# Patient Record
Sex: MALE | Race: WHITE | Employment: UNEMPLOYED | URBAN - NONMETROPOLITAN AREA
[De-identification: names, ages, dates, MRNs, and addresses within clinical notes are randomized per-mention and may not be internally consistent; named-entity substitution may affect disease eponyms.]

---

## 2019-09-14 ENCOUNTER — APPOINTMENT (OUTPATIENT)
Dept: GENERAL RADIOLOGY | Age: 29
End: 2019-09-14
Payer: MEDICAID

## 2019-09-14 ENCOUNTER — HOSPITAL ENCOUNTER (EMERGENCY)
Age: 29
Discharge: HOME | End: 2019-09-14
Payer: MEDICAID

## 2019-09-14 VITALS
BODY MASS INDEX: 24.44 KG/M2 | HEART RATE: 68 BPM | OXYGEN SATURATION: 98 % | WEIGHT: 165 LBS | SYSTOLIC BLOOD PRESSURE: 137 MMHG | TEMPERATURE: 97.8 F | RESPIRATION RATE: 18 BRPM | DIASTOLIC BLOOD PRESSURE: 80 MMHG | HEIGHT: 69 IN

## 2019-09-14 DIAGNOSIS — M25.561 CHRONIC PAIN OF RIGHT KNEE: Primary | ICD-10-CM

## 2019-09-14 DIAGNOSIS — G89.29 CHRONIC PAIN OF RIGHT KNEE: Primary | ICD-10-CM

## 2019-09-14 PROCEDURE — 99283 EMERGENCY DEPT VISIT LOW MDM: CPT

## 2019-09-14 PROCEDURE — 6360000002 HC RX W HCPCS: Performed by: PHYSICIAN ASSISTANT

## 2019-09-14 PROCEDURE — 73564 X-RAY EXAM KNEE 4 OR MORE: CPT

## 2019-09-14 PROCEDURE — 96372 THER/PROPH/DIAG INJ SC/IM: CPT

## 2019-09-14 RX ORDER — METHYLPREDNISOLONE ACETATE 80 MG/ML
80 INJECTION, SUSPENSION INTRA-ARTICULAR; INTRALESIONAL; INTRAMUSCULAR; SOFT TISSUE ONCE
Status: COMPLETED | OUTPATIENT
Start: 2019-09-14 | End: 2019-09-14

## 2019-09-14 RX ADMIN — METHYLPREDNISOLONE ACETATE 80 MG: 80 INJECTION, SUSPENSION INTRA-ARTICULAR; INTRALESIONAL; INTRAMUSCULAR; SOFT TISSUE at 16:09

## 2019-09-14 ASSESSMENT — PAIN SCALES - GENERAL: PAINLEVEL_OUTOF10: 6

## 2019-09-14 ASSESSMENT — ENCOUNTER SYMPTOMS
VOMITING: 0
EYE DISCHARGE: 0
NAUSEA: 0
EYE PAIN: 0
DIARRHEA: 0
WHEEZING: 0
COUGH: 0
BACK PAIN: 0
RHINORRHEA: 0
CHEST TIGHTNESS: 0

## 2019-09-14 ASSESSMENT — PAIN DESCRIPTION - LOCATION: LOCATION: KNEE

## 2019-09-14 ASSESSMENT — PAIN DESCRIPTION - DESCRIPTORS: DESCRIPTORS: ACHING

## 2019-09-14 ASSESSMENT — PAIN DESCRIPTION - ORIENTATION: ORIENTATION: RIGHT

## 2019-09-14 ASSESSMENT — PAIN DESCRIPTION - FREQUENCY: FREQUENCY: CONTINUOUS

## 2019-09-14 ASSESSMENT — PAIN DESCRIPTION - PAIN TYPE: TYPE: CHRONIC PAIN

## 2019-09-14 NOTE — ED NOTES
Patient presents to ED with complaint of chronic right knee pain. Patient denies recent injury. Patient states he uses ketamine to self medicate but is wanting to stop using, stating last use was yesterday. Patient states he wants to have his knee pain addressed today.      Sima Santiago RN  09/14/19 [id number]

## 2019-09-14 NOTE — ED PROVIDER NOTES
was 4 views read per radiology       XR KNEE RIGHT (MIN 4 VIEWS) (Final result)   Result time 09/14/19 15:38:12   Final result by Juancarlos Adams MD (09/14/19 15:38:12)                Impression:    1. No acute radiographic abnormality. **This report has been created using voice recognition software.  It may contain minor errors which are inherent in voice recognition technology. **    Final report electronically signed by Dr. Josi Alonzo on 9/14/2019 3:38 PM            Narrative:    PROCEDURE: XR KNEE RIGHT (MIN 4 VIEWS)    CLINICAL INFORMATION: pain     COMPARISON: No prior study. TECHNIQUE:  Right knee 4 views      FINDINGS:     4 views of the right knee were obtained. No acute fracture or malalignment is seen. No effusion is demonstrated. No periosteal reaction is seen. No osteolytic or osteoblastic lesions are seen. There is a small fabella noted. LABS:   Labs Reviewed - No data to display    EMERGENCY DEPARTMENT COURSE:   :    Vitals:    09/14/19 1500   BP: 137/80   Pulse: 68   Resp: 18   Temp: 97.8 °F (36.6 °C)   TempSrc: Oral   SpO2: 98%   Weight: 165 lb (74.8 kg)   Height: 5' 9\" (1.753 m)     Patient was seen history physical exam was performed. See disposition below    CRITICAL CARE:  None    CONSULTS:  None    PROCEDURES:  None    FINAL IMPRESSION      1. Chronic pain of right knee          DISPOSITION/PLAN   Discharge    PATIENT REFERRED TO:  [address] [place] [address],[address]  [address] [place] [address]  In 2 days      [facility] [address] [place] [address]  [phone]  In 2 days        DISCHARGE MEDICATIONS:  Discharge Medication List as of 9/14/2019  4:02 PM          (Please note that portions of this note were completed with a voice recognitionprogram.  Efforts were made to edit the dictations but occasionally words are mis-transcribed.)    Kathryn Kehr, PA Kathryn Kehr,

## 2020-04-16 ENCOUNTER — TELEPHONE (OUTPATIENT)
Dept: FAMILY MEDICINE CLINIC | Age: 30
End: 2020-04-16

## 2020-04-17 ENCOUNTER — HOSPITAL ENCOUNTER (OUTPATIENT)
Dept: GENERAL RADIOLOGY | Age: 30
Discharge: HOME | End: 2020-04-17
Payer: MEDICARE

## 2020-04-17 ENCOUNTER — OFFICE VISIT (OUTPATIENT)
Dept: PRIMARY CARE CLINIC | Age: 30
End: 2020-04-17
Payer: MEDICARE

## 2020-04-17 ENCOUNTER — NURSE ONLY (OUTPATIENT)
Dept: LAB | Age: 30
End: 2020-04-17

## 2020-04-17 ENCOUNTER — HOSPITAL ENCOUNTER (OUTPATIENT)
Age: 30
Discharge: HOME | End: 2020-04-17
Payer: MEDICARE

## 2020-04-17 VITALS
DIASTOLIC BLOOD PRESSURE: 70 MMHG | RESPIRATION RATE: 18 BRPM | HEIGHT: 69 IN | TEMPERATURE: 98.1 F | WEIGHT: 164 LBS | OXYGEN SATURATION: 98 % | HEART RATE: 66 BPM | BODY MASS INDEX: 24.29 KG/M2 | SYSTOLIC BLOOD PRESSURE: 120 MMHG

## 2020-04-17 PROBLEM — R10.13 EPIGASTRIC ABDOMINAL PAIN: Status: ACTIVE | Noted: 2020-04-17

## 2020-04-17 PROBLEM — F33.1 MODERATE EPISODE OF RECURRENT MAJOR DEPRESSIVE DISORDER (HCC): Status: ACTIVE | Noted: 2020-04-17

## 2020-04-17 PROBLEM — F16.20: Status: ACTIVE | Noted: 2020-04-17

## 2020-04-17 PROBLEM — F17.210 CIGARETTE NICOTINE DEPENDENCE WITHOUT COMPLICATION: Status: ACTIVE | Noted: 2020-04-17

## 2020-04-17 PROBLEM — M54.50 CHRONIC BILATERAL LOW BACK PAIN WITHOUT SCIATICA: Status: ACTIVE | Noted: 2020-04-17

## 2020-04-17 PROBLEM — R39.9 LOWER URINARY TRACT SYMPTOMS (LUTS): Status: ACTIVE | Noted: 2020-04-17

## 2020-04-17 PROBLEM — G89.29 CHRONIC BILATERAL LOW BACK PAIN WITHOUT SCIATICA: Status: ACTIVE | Noted: 2020-04-17

## 2020-04-17 PROBLEM — K21.9 GASTROESOPHAGEAL REFLUX DISEASE: Status: ACTIVE | Noted: 2020-04-17

## 2020-04-17 LAB
ALBUMIN SERPL-MCNC: 5.2 G/DL (ref 3.5–5.1)
ALP BLD-CCNC: 69 U/L (ref 38–126)
ALT SERPL-CCNC: 13 U/L (ref 11–66)
AMORPHOUS: ABNORMAL
AMYLASE: 41 U/L (ref 20–104)
ANION GAP SERPL CALCULATED.3IONS-SCNC: 13 MEQ/L (ref 8–16)
AST SERPL-CCNC: 15 U/L (ref 5–40)
BACTERIA: ABNORMAL
BASOPHILS # BLD: 0.5 %
BASOPHILS ABSOLUTE: 0 THOU/MM3 (ref 0–0.1)
BILIRUB SERPL-MCNC: 0.7 MG/DL (ref 0.3–1.2)
BILIRUBIN URINE: NEGATIVE
BLOOD, URINE: NEGATIVE
BUN BLDV-MCNC: 18 MG/DL (ref 7–22)
CALCIUM SERPL-MCNC: 9.8 MG/DL (ref 8.5–10.5)
CASTS: ABNORMAL /LPF
CASTS: ABNORMAL /LPF
CHARACTER, URINE: CLEAR
CHLORIDE BLD-SCNC: 101 MEQ/L (ref 98–111)
CO2: 26 MEQ/L (ref 23–33)
COLOR: YELLOW
CREAT SERPL-MCNC: 1 MG/DL (ref 0.4–1.2)
CRYSTALS: ABNORMAL
EOSINOPHIL # BLD: 2.6 %
EOSINOPHILS ABSOLUTE: 0.2 THOU/MM3 (ref 0–0.4)
EPITHELIAL CELLS, UA: ABNORMAL /HPF
ERYTHROCYTE [DISTWIDTH] IN BLOOD BY AUTOMATED COUNT: 12.1 % (ref 11.5–14.5)
ERYTHROCYTE [DISTWIDTH] IN BLOOD BY AUTOMATED COUNT: 40.1 FL (ref 35–45)
GFR SERPL CREATININE-BSD FRML MDRD: 89 ML/MIN/1.73M2
GLUCOSE BLD-MCNC: 89 MG/DL (ref 70–108)
GLUCOSE, URINE: NEGATIVE MG/DL
HCT VFR BLD CALC: 44 % (ref 42–52)
HEMOGLOBIN: 14.6 GM/DL (ref 14–18)
HEPATITIS B SURFACE ANTIGEN: NEGATIVE
HEPATITIS C ANTIBODY: NEGATIVE
IGA: 217 MG/DL (ref 70–400)
IMMATURE GRANS (ABS): 0.01 THOU/MM3 (ref 0–0.07)
IMMATURE GRANULOCYTES: 0.1 %
KETONES, URINE: ABNORMAL
LEUKOCYTE ESTERASE, URINE: NEGATIVE
LIPASE: 21.9 U/L (ref 5.6–51.3)
LYMPHOCYTES # BLD: 24.1 %
LYMPHOCYTES ABSOLUTE: 1.8 THOU/MM3 (ref 1–4.8)
MCH RBC QN AUTO: 30.1 PG (ref 26–33)
MCHC RBC AUTO-ENTMCNC: 33.2 GM/DL (ref 32.2–35.5)
MCV RBC AUTO: 90.7 FL (ref 80–94)
MISCELLANEOUS LAB TEST RESULT: ABNORMAL
MONOCYTES # BLD: 8.7 %
MONOCYTES ABSOLUTE: 0.6 THOU/MM3 (ref 0.4–1.3)
MUCUS: ABNORMAL
NITRITE, URINE: NEGATIVE
NUCLEATED RED BLOOD CELLS: 0 /100 WBC
PH UA: 5 (ref 5–9)
PLATELET # BLD: 278 THOU/MM3 (ref 130–400)
PMV BLD AUTO: 10.1 FL (ref 9.4–12.4)
POTASSIUM SERPL-SCNC: 3.6 MEQ/L (ref 3.5–5.2)
PROTEIN UA: ABNORMAL MG/DL
RBC # BLD: 4.85 MILL/MM3 (ref 4.7–6.1)
RBC URINE: ABNORMAL /HPF
RENAL EPITHELIAL, UA: ABNORMAL
SEG NEUTROPHILS: 64 %
SEGMENTED NEUTROPHILS ABSOLUTE COUNT: 4.7 THOU/MM3 (ref 1.8–7.7)
SODIUM BLD-SCNC: 140 MEQ/L (ref 135–145)
SPECIFIC GRAVITY UA: > 1.03 (ref 1–1.03)
TOTAL PROTEIN: 8.3 G/DL (ref 6.1–8)
TSH SERPL DL<=0.05 MIU/L-ACNC: 1.84 UIU/ML (ref 0.4–4.2)
UROBILINOGEN, URINE: 0.2 EU/DL (ref 0–1)
WBC # BLD: 7.3 THOU/MM3 (ref 4.8–10.8)
WBC UA: ABNORMAL /HPF
YEAST: ABNORMAL

## 2020-04-17 PROCEDURE — 93000 ELECTROCARDIOGRAM COMPLETE: CPT | Performed by: FAMILY MEDICINE

## 2020-04-17 PROCEDURE — G8427 DOCREV CUR MEDS BY ELIG CLIN: HCPCS | Performed by: FAMILY MEDICINE

## 2020-04-17 PROCEDURE — 72100 X-RAY EXAM L-S SPINE 2/3 VWS: CPT

## 2020-04-17 PROCEDURE — G8420 CALC BMI NORM PARAMETERS: HCPCS | Performed by: FAMILY MEDICINE

## 2020-04-17 PROCEDURE — 99204 OFFICE O/P NEW MOD 45 MIN: CPT | Performed by: FAMILY MEDICINE

## 2020-04-17 PROCEDURE — 4004F PT TOBACCO SCREEN RCVD TLK: CPT | Performed by: FAMILY MEDICINE

## 2020-04-17 PROCEDURE — 96160 PT-FOCUSED HLTH RISK ASSMT: CPT | Performed by: FAMILY MEDICINE

## 2020-04-17 RX ORDER — KETAMINE HCL 100 %
POWDER (GRAM) MISCELLANEOUS
COMMUNITY
End: 2020-04-17 | Stop reason: CLARIF

## 2020-04-17 RX ORDER — OMEPRAZOLE 20 MG/1
20 CAPSULE, DELAYED RELEASE ORAL
Qty: 60 CAPSULE | Refills: 2 | Status: SHIPPED | OUTPATIENT
Start: 2020-04-17

## 2020-04-17 RX ORDER — TIZANIDINE 4 MG/1
4 TABLET ORAL EVERY 8 HOURS PRN
Qty: 30 TABLET | Refills: 0 | Status: SHIPPED | OUTPATIENT
Start: 2020-04-17

## 2020-04-17 ASSESSMENT — COLUMBIA-SUICIDE SEVERITY RATING SCALE - C-SSRS
5. HAVE YOU STARTED TO WORK OUT OR WORKED OUT THE DETAILS OF HOW TO KILL YOURSELF? DO YOU INTEND TO CARRY OUT THIS PLAN?: NO
6. HAVE YOU EVER DONE ANYTHING, STARTED TO DO ANYTHING, OR PREPARED TO DO ANYTHING TO END YOUR LIFE?: NO
4. HAVE YOU HAD THESE THOUGHTS AND HAD SOME INTENTION OF ACTING ON THEM?: NO
1. WITHIN THE PAST MONTH, HAVE YOU WISHED YOU WERE DEAD OR WISHED YOU COULD GO TO SLEEP AND NOT WAKE UP?: YES
2. HAVE YOU ACTUALLY HAD ANY THOUGHTS OF KILLING YOURSELF?: YES
3. HAVE YOU BEEN THINKING ABOUT HOW YOU MIGHT KILL YOURSELF?: NO

## 2020-04-17 ASSESSMENT — PATIENT HEALTH QUESTIONNAIRE - PHQ9
2. FEELING DOWN, DEPRESSED OR HOPELESS: 2
4. FEELING TIRED OR HAVING LITTLE ENERGY: 3
5. POOR APPETITE OR OVEREATING: 2
SUM OF ALL RESPONSES TO PHQ9 QUESTIONS 1 & 2: 3
10. IF YOU CHECKED OFF ANY PROBLEMS, HOW DIFFICULT HAVE THESE PROBLEMS MADE IT FOR YOU TO DO YOUR WORK, TAKE CARE OF THINGS AT HOME, OR GET ALONG WITH OTHER PEOPLE: 1
7. TROUBLE CONCENTRATING ON THINGS, SUCH AS READING THE NEWSPAPER OR WATCHING TELEVISION: 1
9. THOUGHTS THAT YOU WOULD BE BETTER OFF DEAD, OR OF HURTING YOURSELF: 1
8. MOVING OR SPEAKING SO SLOWLY THAT OTHER PEOPLE COULD HAVE NOTICED. OR THE OPPOSITE, BEING SO FIGETY OR RESTLESS THAT YOU HAVE BEEN MOVING AROUND A LOT MORE THAN USUAL: 0
SUM OF ALL RESPONSES TO PHQ QUESTIONS 1-9: 15
SUM OF ALL RESPONSES TO PHQ QUESTIONS 1-9: 15
6. FEELING BAD ABOUT YOURSELF - OR THAT YOU ARE A FAILURE OR HAVE LET YOURSELF OR YOUR FAMILY DOWN: 2
1. LITTLE INTEREST OR PLEASURE IN DOING THINGS: 1
3. TROUBLE FALLING OR STAYING ASLEEP: 3

## 2020-04-17 NOTE — PROGRESS NOTES
no  Constipation? no  Blood in Stools? no  Back Pain? no  LMP?      -Urinary Symptoms:    HPI:  Mild dysuria on and off  occ trouble empytying bladder  No hematuria  Going on a few years  Unable to give urine sample today    Difficulty initiating a stream? occasionally  Weak urinary stream?  No  Dysuria? Yes  Nocturia? No  Hematuria? No  Perineal discomfort? No      -smoking:  Not ready to quit  1 PPD      Current Outpatient Medications   Medication Sig Dispense Refill    tiZANidine (ZANAFLEX) 4 MG tablet Take 1 tablet by mouth every 8 hours as needed (prn low back pain) 30 tablet 0    omeprazole (PRILOSEC) 20 MG delayed release capsule Take 1 capsule by mouth 2 times daily (before meals) 60 capsule 2     No current facility-administered medications for this visit. Orders Placed This Encounter   Medications    tiZANidine (ZANAFLEX) 4 MG tablet     Sig: Take 1 tablet by mouth every 8 hours as needed (prn low back pain)     Dispense:  30 tablet     Refill:  0    omeprazole (PRILOSEC) 20 MG delayed release capsule     Sig: Take 1 capsule by mouth 2 times daily (before meals)     Dispense:  60 capsule     Refill:  2         All medications reviewed and reconciled, including OTC and herbal medications. Updated list given to patient.        Patient Active Problem List    Diagnosis Date Noted    Ketamine dependence ([facility] [address] 04/17/2020    Moderate episode of recurrent major depressive disorder ([facility] [address] 04/17/2020    Chronic bilateral low back pain without sciatica 04/17/2020    Cigarette nicotine dependence without complication 69/03/2906    Lower urinary tract symptoms (LUTS) 04/17/2020    Epigastric abdominal pain 04/17/2020    Gastroesophageal reflux disease 04/17/2020         Past Medical History:   Diagnosis Date    Anxiety     Chronic back pain     Depression          Past Surgical History:   Procedure Laterality Date    [facility]  2011    [facility]    HERNIA REPAIR      TESTICLE SURGERY      VEIN TempSrc: Oral   SpO2: 98%   Weight: 164 lb (74.4 kg)   Height: 5' 9\" (1.753 m)     Body mass index is 24.22 kg/m². Pain Score:   4(low back)    VS Reviewed  General Appearance: A&O x 3, No acute distress,well developed and well- nourished  Head: normocephalic and atraumatic  Eyes: pupils equal, round, and reactive to light, extraocular eye movements intact, conjunctivae and eye lids without erythema  ENT: external ear and ear canal clear bilaterally, TMs intact and regular, nose without deformity, nasal mucosa and turbinates normal without polyps, oropharynx normal, dentition is normal for age  Neck: supple and non-tender without mass, no thyromegaly or thyroid nodules, no cervical lymphadenopathy  Pulmonary/Chest: clear to auscultation bilaterally- no wheezes, rales or rhonchi, normal air movement, no respiratory distress or retractions  Cardiovascular: S1 and S2 auscultated w/ RRR. No murmurs, rubs, clicks, or gallops, distal pulses intact. Abdomen: soft,mild epigastric TTP, non-tender otherwise, non-distended, bowel sounds physiologic,  no rebound or guarding, no masses or hernias noted. Liver and spleen without enlargement. Extremities: no cyanosis, clubbing or edema of the lower extremities. +2 PT/DP bilaterally. Musculoskeletal: No joint swelling or gross deformity   Neuro:  Alert, 5/5 strength globally and symmetrically, 2+ patellar reflexes bilaterally  Psych: Affect constricted. Mood depressed. Thought process is normal without evidence of psychosis. Good insight and appropriate interaction. Cognition and memory appear to be intact. Skin: warm and dry, no rash or erythema  Lymph:  No cervical, auricular or supraclavicular lymph nodes palpated  LUMBAR SPINE EXAM:  Examination of the Lumbar spine shows:  Deformity Absent . Soft Tissue Swelling Absent . Soft Tissue Tenderness Present. Midline Bone Tenderness Absent . Paraspinal Muscular Spasm Present. Previous Incisions Absent .   Erythema Absent . Lumbar Flexion does not produce pain. Lumbar Extension does not produce pain. NEUROLOGICAL EXAM:  SLR     Left: Negative. Right Negative. DTR 2+ bilaterally  Grant's Sign Absent   Gait normal Heel/ Toe. Sensation to Touch normal    LE strength    Right Left   Hip Flexors 5/5 5/5   Hip Extensors 5/5 5/5   Knee Flexors 5/5 5/5   Knee Extensors 5/5 5/5   Ankle Flexors 5/5 5/5   Ankle Extensors 5/5 5/5   Extensor Hallicus Longus 5/5 5/5   Dorsiflexors 5/5 5/5     Sensation:  T12 100% 100%   L1 100% 100%   L2 100% 100%   L3 100% 100%   L4 100% 100%   L5 100% 100%   S1 100% 100%   S2 100% 100%   S3-S5 100% 100%       ASSESSMENT & PLAN  1. Ketamine dependence ([facility] [address]    Tough case  Really needs to EST with PCP in [place] so we can get EST With appropriate substance abuse and mental health resources  I'm really limited in what I can do today and really have no way to coordinate with things in [place]. What I can do today is get some baseline labs and get him set up with our care coordinator to see if they can link him up with /CM through his insurance to get him EST with the proper care. He is agreeable and appreciate of this  Will f/u labs by phone  He had planned to EST with Dr. Alee May here in [facility], but I feel it's in his best interest to EST in [place]. He agrees. - CBC Auto Differential; Future  - Comprehensive Metabolic Panel; Future  - Hepatitis B Surface Antigen; Future  - Hepatitis C Antibody; Future  - HIV Screen; Future    2. Moderate episode of recurrent major depressive disorder ([facility] [address]    Chronic issue  He doesn't really want to treat this at the moment  No SI/HI  Makes more sense to get him EST down in [place] for ongoing care. 3. Chronic bilateral low back pain without sciatica    Given HEP  Xray d/t chronicity  Short course zanaflex    - XR LUMBAR SPINE (2-3 VIEWS); Future  - tiZANidine (ZANAFLEX) 4 MG tablet;  Take 1 tablet by mouth every 8 hours as needed (prn low back

## 2020-04-17 NOTE — PATIENT INSTRUCTIONS
American Lung Association's Freedom from Smoking program.  · Get text messaging support. Go to the website at www.smokefree. gov to sign up for the [facility] program.  · Set a quit date. Pick your date carefully so that it is not right in the middle of a big deadline or stressful time. Once you quit, do not even take a puff. Get rid of all ashtrays and lighters after your last cigarette. Clean your house and your clothes so that they do not smell of smoke. · Learn how to be a nonsmoker. Think about ways you can avoid those things that make you reach for a cigarette. ? Avoid situations that put you at greatest risk for smoking. For some people, it is hard to have a drink with friends without smoking. For others, they might skip a coffee break with coworkers who smoke. ? Change your daily routine. Take a different route to work or eat a meal in a different place. · Cut down on stress. Calm yourself or release tension by doing an activity you enjoy, such as reading a book, taking a hot bath, or gardening. · Talk to your doctor or pharmacist about nicotine replacement therapy, which replaces the nicotine in your body. You still get nicotine but you do not use tobacco. Nicotine replacement products help you slowly reduce the amount of nicotine you need. These products come in several forms, many of them available over-the-counter:  ? Nicotine patches  ? Nicotine gum and lozenges  ? Nicotine inhaler  · Ask your doctor about bupropion (Wellbutrin) or varenicline (Chantix), which are prescription medicines. They do not contain nicotine. They help you by reducing withdrawal symptoms, such as stress and anxiety. · Some people find hypnosis, acupuncture, and massage helpful for ending the smoking habit. · Eat a healthy diet and get regular exercise. Having healthy habits will help your body move past its craving for nicotine. · Be prepared to keep trying.  Most people are not successful the first few times they try to quit. Do not get mad at yourself if you smoke again. Make a list of things you learned and think about when you want to try again, such as next week, next month, or next year. Where can you learn more? Go to https://eloise.exsulin. org and sign in to your [employer] account. Enter R404 in the Edyn box to learn more about \"Stopping Smoking: Care Instructions. \"     If you do not have an account, please click on the \"Sign Up Now\" link. Current as of: July 4, 2019Content Version: 12.4  © 4398-2445 Healthwise, Incorporated. Care instructions adapted under license by [facility] ([facility]). If you have questions about a medical condition or this instruction, always ask your healthcare professional. [address] any warranty or liability for your use of this information.

## 2020-04-18 LAB — HIV-2 AB: NEGATIVE

## 2020-04-19 LAB
TISSUE TRANSGLUTAMINASE IGA: < 2 U/ML (ref 0–3)
URINE CULTURE, ROUTINE: NORMAL

## 2020-04-20 ENCOUNTER — TELEPHONE (OUTPATIENT)
Dept: FAMILY MEDICINE CLINIC | Age: 30
End: 2020-04-20

## 2020-04-20 NOTE — TELEPHONE ENCOUNTER
----- Message from Leonel Reed DO sent at 4/20/2020  7:43 AM EDT -----  Please let pt know that lab work so far is normal. Final urine test is pending yet, but so far it is normal as well. Xray of back showed small defect in lower back, but appears minor and it's doubtful it's causing most of his pain. Recommend con't with home exercises given in office. Once EST with PCP in [place], would recommend physical therapy. Let me know if questions, thanks!

## 2020-04-21 ENCOUNTER — CARE COORDINATION (OUTPATIENT)
Dept: CARE COORDINATION | Age: 30
End: 2020-04-21

## 2020-04-24 RX ORDER — TRAZODONE HYDROCHLORIDE 50 MG/1
50 TABLET ORAL NIGHTLY
Qty: 30 TABLET | Refills: 2 | Status: SHIPPED | OUTPATIENT
Start: 2020-04-24 | End: 2020-06-29 | Stop reason: SDUPTHER

## 2020-06-28 ENCOUNTER — PATIENT MESSAGE (OUTPATIENT)
Dept: FAMILY MEDICINE CLINIC | Age: 30
End: 2020-06-28

## 2020-06-29 RX ORDER — TRAZODONE HYDROCHLORIDE 50 MG/1
50 TABLET ORAL NIGHTLY
Qty: 90 TABLET | Refills: 3 | Status: SHIPPED | OUTPATIENT
Start: 2020-06-29

## 2020-06-29 RX ORDER — TRAZODONE HYDROCHLORIDE 50 MG/1
50 TABLET ORAL NIGHTLY
Qty: 30 TABLET | Refills: 2 | OUTPATIENT
Start: 2020-06-29

## 2020-09-08 RX ORDER — TRAZODONE HYDROCHLORIDE 50 MG/1
TABLET ORAL
Qty: 30 TABLET | Refills: 2 | OUTPATIENT
Start: 2020-09-08

## 2024-01-14 ENCOUNTER — HOSPITAL ENCOUNTER (EMERGENCY)
Age: 34
Discharge: HOME | End: 2024-01-14
Payer: COMMERCIAL

## 2024-01-14 ENCOUNTER — APPOINTMENT (OUTPATIENT)
Dept: GENERAL RADIOLOGY | Age: 34
End: 2024-01-14
Payer: COMMERCIAL

## 2024-01-14 VITALS
DIASTOLIC BLOOD PRESSURE: 89 MMHG | HEART RATE: 62 BPM | SYSTOLIC BLOOD PRESSURE: 131 MMHG | RESPIRATION RATE: 14 BRPM | OXYGEN SATURATION: 99 % | TEMPERATURE: 98.2 F

## 2024-01-14 DIAGNOSIS — S62.316A DISPLACED FRACTURE OF BASE OF FIFTH METACARPAL BONE, RIGHT HAND, INITIAL ENCOUNTER FOR CLOSED FRACTURE: Primary | ICD-10-CM

## 2024-01-14 PROCEDURE — 99202 OFFICE O/P NEW SF 15 MIN: CPT | Performed by: NURSE PRACTITIONER

## 2024-01-14 PROCEDURE — 73130 X-RAY EXAM OF HAND: CPT

## 2024-01-14 PROCEDURE — 99203 OFFICE O/P NEW LOW 30 MIN: CPT

## 2024-01-14 PROCEDURE — 29125 APPL SHORT ARM SPLINT STATIC: CPT

## 2024-01-14 RX ORDER — LAMOTRIGINE 200 MG/1
250 TABLET ORAL DAILY
COMMUNITY

## 2024-01-14 RX ORDER — PROPRANOLOL HYDROCHLORIDE 10 MG/1
10 TABLET ORAL 3 TIMES DAILY
COMMUNITY

## 2024-01-14 RX ORDER — ARIPIPRAZOLE 10 MG/1
10 TABLET ORAL DAILY
COMMUNITY

## 2024-01-14 ASSESSMENT — PAIN DESCRIPTION - DESCRIPTORS: DESCRIPTORS: THROBBING;TINGLING

## 2024-01-14 ASSESSMENT — PAIN DESCRIPTION - ORIENTATION: ORIENTATION: RIGHT

## 2024-01-14 ASSESSMENT — PAIN - FUNCTIONAL ASSESSMENT: PAIN_FUNCTIONAL_ASSESSMENT: 0-10

## 2024-01-14 ASSESSMENT — PAIN SCALES - GENERAL: PAINLEVEL_OUTOF10: 7

## 2024-01-14 ASSESSMENT — PAIN DESCRIPTION - FREQUENCY: FREQUENCY: CONTINUOUS

## 2024-01-14 ASSESSMENT — PAIN DESCRIPTION - LOCATION: LOCATION: HAND

## 2024-01-14 ASSESSMENT — PAIN DESCRIPTION - PAIN TYPE: TYPE: ACUTE PAIN
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
## 2024-01-14 NOTE — DISCHARGE INSTR - COC
(0-10 scale): Pain Level: 7  Last Weight:   Wt Readings from Last 1 Encounters:   20 74.4 kg (164 lb)     Mental Status:  {IP PT MENTAL STATUS:}    IV Access:  { NORBERTO IV ACCESS:787072955}    Nursing Mobility/ADLs:  Walking   {CHP DME ADLs:712669509}  Transfer  {CHP DME ADLs:455472644}  Bathing  {CHP DME ADLs:217362759}  Dressing  {CHP DME ADLs:117607749}  Toileting  {CHP DME ADLs:507950027}  Feeding  {CHP DME ADLs:450577713}  Med Admin  {CHP DME ADLs:571522365}  Med Delivery   { NORBERTO MED Delivery:408728592}    Wound Care Documentation and Therapy:        Elimination:  Continence:   Bowel: {YES / NO:}  Bladder: {YES / NO:}  Urinary Catheter: {Urinary Catheter:748564732}   Colostomy/Ileostomy/Ileal Conduit: {YES / NO:}       Date of Last BM: ***  No intake or output data in the 24 hours ending 24 1518  No intake/output data recorded.    Safety Concerns:     { NORBERTO Safety Concerns:268145067}    Impairments/Disabilities:      { NORBERTO Impairments/Disabilities:016766742}    Nutrition Therapy:  Current Nutrition Therapy:   { NORBERTO Diet List:668006411}    Routes of Feeding: {CHP DME Other Feedings:841865102}  Liquids: {Slp liquid thickness:73624}  Daily Fluid Restriction: {CHP DME Yes amt example:927718102}  Last Modified Barium Swallow with Video (Video Swallowing Test): {Done Not Done Date:711685060}    Treatments at the Time of Hospital Discharge:   Respiratory Treatments: ***  Oxygen Therapy:  {Therapy; copd oxygen:98378}  Ventilator:    { CC Vent List:601997074}    Rehab Therapies: {THERAPEUTIC INTERVENTION:8872561950}  Weight Bearing Status/Restrictions: { CC Weight Bearin}  Other Medical Equipment (for information only, NOT a DME order):  {EQUIPMENT:807478486}  Other Treatments: ***    Patient's personal belongings (please select all that are sent with patient):  {[facility] DME Belongings:390706484}    RN SIGNATURE:  {Esignature:016826074}    CASE MANAGEMENT/SOCIAL WORK  SECTION    Inpatient Status Date: ***    Readmission Risk Assessment Score:  Readmission Risk              Risk of Unplanned Readmission:  0           Discharging to Facility/ Agency   Name:   Address:  Phone:  Fax:    Dialysis Facility (if applicable)   Name:  Address:  Dialysis Schedule:  Phone:  Fax:    / signature: {Esignature:095669076}    PHYSICIAN SECTION    Prognosis: {Prognosis:8088918748}    Condition at Discharge: { Patient Condition:925397582}    Rehab Potential (if transferring to Rehab): {Prognosis:3419132660}    Recommended Labs or Other Treatments After Discharge: ***    Physician Certification: I certify the above information and transfer of Juvenalhailee HUAN Zabala  is necessary for the continuing treatment of the diagnosis listed and that he requires {Admit to Appropriate Level of Care:41129} for {GREATER/LESS:011555045} 30 days.     Update Admission H&P: {CHP DME Changes in HandP:430032648}    PHYSICIAN SIGNATURE:  {Esignature:650734142}

## 2024-06-06 ENCOUNTER — TELEPHONE (OUTPATIENT)
Dept: FAMILY MEDICINE CLINIC | Age: 34
End: 2024-06-06

## 2024-06-06 NOTE — TELEPHONE ENCOUNTER
----- Message from Taina Valle sent at 6/6/2024 10:46 AM EDT -----  Regarding: FW: ECC Appointment Request  Please advise  ----- Message -----  From: Suresh Melgar  Sent: 6/5/2024   3:10 PM EDT  [facility]  Subject: ECC Appointment Request                          ECC Appointment Request    Patient needs appointment for ECC Appointment Type: New to Provider.    Reason for Appointment Request: No appointments available during search    Additional information: This is for patient wellness check up / new to provider to Do Amado Finney / patients want appointment time is Anytime on 4pm  --------------------------------------------------------------------------------------------------------------------------    Relationship to Patient: Self     Call Back Information: OK to leave message on voicemail  Preferred Call Back Number: Phone [phone]

## 2024-06-06 NOTE — TELEPHONE ENCOUNTER
Best I can do is next new pt slot.     If needs sooner, can schedule with another provider in office and EST with them    However, did no-show new pt apt with Jan in 2021

## 2024-06-07 NOTE — TELEPHONE ENCOUNTER
Patient called and scheduled with Shoshana for soonest new patient appointment.     Future Appointments   Date Time Provider Department Center   7/16/2024  3:40 PM Phyllis Paez, VIC - CNP [facility] [place]

## 2024-07-13 SDOH — HEALTH STABILITY: PHYSICAL HEALTH: ON AVERAGE, HOW MANY DAYS PER WEEK DO YOU ENGAGE IN MODERATE TO STRENUOUS EXERCISE (LIKE A BRISK WALK)?: 0 DAYS

## 2024-07-15 PROBLEM — F16.11: Status: ACTIVE | Noted: 2020-09-04

## 2024-07-15 PROBLEM — G47.00 INSOMNIA DISORDER: Status: ACTIVE | Noted: 2020-09-04

## 2024-07-15 PROBLEM — K57.30 DIVERTICULOSIS OF COLON: Status: ACTIVE | Noted: 2020-09-21

## 2024-07-15 PROBLEM — K40.90 INGUINAL HERNIA OF RIGHT SIDE WITHOUT OBSTRUCTION OR GANGRENE: Status: ACTIVE | Noted: 2020-09-04

## 2024-07-15 PROBLEM — R43.0 ANOSMIA: Status: ACTIVE | Noted: 2020-09-04

## 2024-07-16 ENCOUNTER — OFFICE VISIT (OUTPATIENT)
Dept: FAMILY MEDICINE CLINIC | Age: 34
End: 2024-07-16
Payer: COMMERCIAL

## 2024-07-16 DIAGNOSIS — M79.671 RIGHT FOOT PAIN: ICD-10-CM

## 2024-07-16 DIAGNOSIS — K58.9 IRRITABLE BOWEL SYNDROME, UNSPECIFIED TYPE: ICD-10-CM

## 2024-07-16 DIAGNOSIS — F33.1 MODERATE EPISODE OF RECURRENT MAJOR DEPRESSIVE DISORDER (HCC): ICD-10-CM

## 2024-07-16 DIAGNOSIS — R55 PRE-SYNCOPE: ICD-10-CM

## 2024-07-16 DIAGNOSIS — R00.2 PALPITATIONS: ICD-10-CM

## 2024-07-16 DIAGNOSIS — R07.9 CHEST PAIN, UNSPECIFIED TYPE: Primary | ICD-10-CM

## 2024-07-16 DIAGNOSIS — K57.30 DIVERTICULOSIS OF COLON: ICD-10-CM

## 2024-07-16 PROBLEM — F16.20: Status: RESOLVED | Noted: 2020-04-17 | Resolved: 2024-07-16

## 2024-07-16 PROCEDURE — 99205 OFFICE O/P NEW HI 60 MIN: CPT | Performed by: NURSE PRACTITIONER

## 2024-07-16 PROCEDURE — 93000 ELECTROCARDIOGRAM COMPLETE: CPT | Performed by: NURSE PRACTITIONER

## 2024-07-16 ASSESSMENT — PATIENT HEALTH QUESTIONNAIRE - PHQ9
6. FEELING BAD ABOUT YOURSELF - OR THAT YOU ARE A FAILURE OR HAVE LET YOURSELF OR YOUR FAMILY DOWN: NOT AT ALL
SUM OF ALL RESPONSES TO PHQ9 QUESTIONS 1 & 2: 2
4. FEELING TIRED OR HAVING LITTLE ENERGY: NEARLY EVERY DAY
10. IF YOU CHECKED OFF ANY PROBLEMS, HOW DIFFICULT HAVE THESE PROBLEMS MADE IT FOR YOU TO DO YOUR WORK, TAKE CARE OF THINGS AT HOME, OR GET ALONG WITH OTHER PEOPLE: SOMEWHAT DIFFICULT
9. THOUGHTS THAT YOU WOULD BE BETTER OFF DEAD, OR OF HURTING YOURSELF: NOT AT ALL
SUM OF ALL RESPONSES TO PHQ QUESTIONS 1-9: 17
8. MOVING OR SPEAKING SO SLOWLY THAT OTHER PEOPLE COULD HAVE NOTICED. OR THE OPPOSITE, BEING SO FIGETY OR RESTLESS THAT YOU HAVE BEEN MOVING AROUND A LOT MORE THAN USUAL: NEARLY EVERY DAY
SUM OF ALL RESPONSES TO PHQ QUESTIONS 1-9: 17
5. POOR APPETITE OR OVEREATING: NEARLY EVERY DAY
7. TROUBLE CONCENTRATING ON THINGS, SUCH AS READING THE NEWSPAPER OR WATCHING TELEVISION: NEARLY EVERY DAY
2. FEELING DOWN, DEPRESSED OR HOPELESS: NOT AT ALL
SUM OF ALL RESPONSES TO PHQ QUESTIONS 1-9: 17
1. LITTLE INTEREST OR PLEASURE IN DOING THINGS: MORE THAN HALF THE DAYS
3. TROUBLE FALLING OR STAYING ASLEEP: NEARLY EVERY DAY
SUM OF ALL RESPONSES TO PHQ QUESTIONS 1-9: 17

## 2024-07-16 NOTE — PROGRESS NOTES
is nervous/anxious.            PHYSICAL EXAM:  There were no vitals taken for this visit.    Physical Exam  Vitals reviewed.   Cardiovascular:      Rate and Rhythm: Regular rhythm.      Heart sounds: Normal heart sounds.   Pulmonary:      Breath sounds: Normal breath sounds.   Abdominal:      Palpations: Abdomen is soft.   Skin:     General: Skin is warm and dry.      Comments: Benign nevi noted on back  One to monitor on right shoulder blade   Neurological:      Mental Status: He is alert and oriented to person, place, and time.   Psychiatric:         Mood and Affect: Mood normal.         Behavior: Behavior normal.           ASSESSMENT & PLAN  1. Chest pain, unspecified type  EKG reviewed, no changes from prior  Has had stress test in the last year that was normal  Anxiety??  - EKG 12 Lead - Clinic Performed; Future  - EKG 12 Lead - Clinic Performed  - CBC with Auto Differential; Future  - Comprehensive Metabolic Panel; Future  - TSH With Reflex Ft4; Future  - Tissue Transglutaminase, IgA; Future    2. Palpitations    - Extended cardiac holter monitor (3 days-14 day); Future    3. Pre-syncope    - Extended cardiac holter monitor (3 days-14 day); Future    4. Moderate episode of recurrent major depressive disorder (HCC)  Continue Abilify, lamictal   Fu with current psychiatric provider    5. Diverticulosis of colon  Changes in stool habits  Recent blood noted on toilet paper  - CBC with Auto Differential; Future  - Comprehensive Metabolic Panel; Future  - TSH With Reflex Ft4; Future  - Tissue Transglutaminase, IgA; Future  - AFL (Epic) - Ashley Zheng MD, Gastroenterology, [place]    6. Irritable bowel syndrome, unspecified type    - AFL (Epic) - Ashley Zheng MD, Gastroenterology, [place]    7. Right foot pain    - XR FOOT RIGHT (MIN 3 VIEWS); Future    8. Anxiety  Continue Propanolol      I spent 51 minutes reviewing previous notes and testing, discussing symptoms, medications and side effects, treatment options with

## 2024-07-17 ENCOUNTER — TELEPHONE (OUTPATIENT)
Dept: FAMILY MEDICINE CLINIC | Age: 34
End: 2024-07-17

## 2024-07-17 ENCOUNTER — HOSPITAL ENCOUNTER (OUTPATIENT)
Age: 34
Discharge: HOME | End: 2024-07-19
Payer: COMMERCIAL

## 2024-07-17 ENCOUNTER — HOSPITAL ENCOUNTER (OUTPATIENT)
Age: 34
Discharge: HOME | End: 2024-07-17
Payer: COMMERCIAL

## 2024-07-17 ENCOUNTER — HOSPITAL ENCOUNTER (OUTPATIENT)
Dept: GENERAL RADIOLOGY | Age: 34
Discharge: HOME | End: 2024-07-17
Payer: COMMERCIAL

## 2024-07-17 DIAGNOSIS — R07.9 CHEST PAIN, UNSPECIFIED TYPE: ICD-10-CM

## 2024-07-17 DIAGNOSIS — R00.2 PALPITATIONS: ICD-10-CM

## 2024-07-17 DIAGNOSIS — K57.30 DIVERTICULOSIS OF COLON: ICD-10-CM

## 2024-07-17 DIAGNOSIS — R55 PRE-SYNCOPE: ICD-10-CM

## 2024-07-17 DIAGNOSIS — M79.671 RIGHT FOOT PAIN: ICD-10-CM

## 2024-07-17 LAB
ALBUMIN SERPL BCG-MCNC: 4.9 G/DL (ref 3.5–5.1)
ALP SERPL-CCNC: 58 U/L (ref 38–126)
ALT SERPL W/O P-5'-P-CCNC: 11 U/L (ref 11–66)
ANION GAP SERPL CALC-SCNC: 12 MEQ/L (ref 8–16)
AST SERPL-CCNC: 13 U/L (ref 5–40)
BASOPHILS ABSOLUTE: 0 THOU/MM3 (ref 0–0.1)
BASOPHILS NFR BLD AUTO: 0.6 %
BILIRUB SERPL-MCNC: 0.6 MG/DL (ref 0.3–1.2)
BUN SERPL-MCNC: 18 MG/DL (ref 7–22)
CALCIUM SERPL-MCNC: 9.1 MG/DL (ref 8.5–10.5)
CHLORIDE SERPL-SCNC: 105 MEQ/L (ref 98–111)
CO2 SERPL-SCNC: 23 MEQ/L (ref 23–33)
CREAT SERPL-MCNC: 0.9 MG/DL (ref 0.4–1.2)
DEPRECATED RDW RBC AUTO: 41.5 FL (ref 35–45)
EOSINOPHIL NFR BLD AUTO: 4.6 %
EOSINOPHILS ABSOLUTE: 0.3 THOU/MM3 (ref 0–0.4)
ERYTHROCYTE [DISTWIDTH] IN BLOOD BY AUTOMATED COUNT: 12.7 % (ref 11.5–14.5)
GFR SERPL CREATININE-BSD FRML MDRD: > 90 ML/MIN/1.73M2
GLUCOSE SERPL-MCNC: 89 MG/DL (ref 70–108)
HCT VFR BLD AUTO: 43.4 % (ref 42–52)
HGB BLD-MCNC: 14.3 GM/DL (ref 14–18)
IMM GRANULOCYTES # BLD AUTO: 0.02 THOU/MM3 (ref 0–0.07)
IMM GRANULOCYTES NFR BLD AUTO: 0.3 %
LYMPHOCYTES ABSOLUTE: 1.9 THOU/MM3 (ref 1–4.8)
LYMPHOCYTES NFR BLD AUTO: 27.7 %
MCH RBC QN AUTO: 29.5 PG (ref 26–33)
MCHC RBC AUTO-ENTMCNC: 32.9 GM/DL (ref 32.2–35.5)
MCV RBC AUTO: 89.7 FL (ref 80–94)
MONOCYTES ABSOLUTE: 0.6 THOU/MM3 (ref 0.4–1.3)
MONOCYTES NFR BLD AUTO: 8.4 %
NEUTROPHILS ABSOLUTE: 4 THOU/MM3 (ref 1.8–7.7)
NEUTROPHILS NFR BLD AUTO: 58.4 %
NRBC BLD AUTO-RTO: 0 /100 WBC
PLATELET # BLD AUTO: 290 THOU/MM3 (ref 130–400)
PMV BLD AUTO: 10 FL (ref 9.4–12.4)
POTASSIUM SERPL-SCNC: 4.6 MEQ/L (ref 3.5–5.2)
PROT SERPL-MCNC: 7 G/DL (ref 6.1–8)
RBC # BLD AUTO: 4.84 MILL/MM3 (ref 4.7–6.1)
SODIUM SERPL-SCNC: 140 MEQ/L (ref 135–145)
TSH SERPL DL<=0.005 MIU/L-ACNC: 1.54 UIU/ML (ref 0.4–4.2)
WBC # BLD AUTO: 6.8 THOU/MM3 (ref 4.8–10.8)

## 2024-07-17 PROCEDURE — 83516 IMMUNOASSAY NONANTIBODY: CPT

## 2024-07-17 PROCEDURE — 84443 ASSAY THYROID STIM HORMONE: CPT

## 2024-07-17 PROCEDURE — 85025 COMPLETE CBC W/AUTO DIFF WBC: CPT

## 2024-07-17 PROCEDURE — 36415 COLL VENOUS BLD VENIPUNCTURE: CPT

## 2024-07-17 PROCEDURE — 93246 EXT ECG>7D<15D RECORDING: CPT

## 2024-07-17 PROCEDURE — 73630 X-RAY EXAM OF FOOT: CPT

## 2024-07-17 PROCEDURE — 80053 COMPREHEN METABOLIC PANEL: CPT

## 2024-07-17 NOTE — TELEPHONE ENCOUNTER
----- Message from VIC Ruffin CNP sent at 7/17/2024  2:20 PM EDT -----  Foot xray normal without fracture

## 2024-07-18 ENCOUNTER — TELEPHONE (OUTPATIENT)
Dept: FAMILY MEDICINE CLINIC | Age: 34
End: 2024-07-18

## 2024-07-18 PROBLEM — F16.11: Status: RESOLVED | Noted: 2020-09-04 | Resolved: 2024-07-18

## 2024-07-18 ASSESSMENT — ENCOUNTER SYMPTOMS: SHORTNESS OF BREATH: 0

## 2024-07-18 NOTE — TELEPHONE ENCOUNTER
----- Message from VIC Ruffin - CNP sent at 7/18/2024  7:15 AM EDT -----  Labs are normal. No changes at this time

## 2024-07-19 LAB — TTG IGA SER IA-ACNC: 0.5 U/ML

## 2024-08-12 ENCOUNTER — TELEPHONE (OUTPATIENT)
Dept: FAMILY MEDICINE CLINIC | Age: 34
End: 2024-08-12

## 2024-08-12 NOTE — TELEPHONE ENCOUNTER
Left detailed message regarding results. Okay per HIPAA. Requested call back if any further questions

## 2024-08-12 NOTE — TELEPHONE ENCOUNTER
----- Message from VIC Klein CNP sent at 8/12/2024  7:40 AM EDT -----  His heart monitor showed normal sinus rhythm no arrhythmias but did have some episodes of faster heart rates in 160s

## 2024-08-13 ENCOUNTER — HOSPITAL ENCOUNTER (EMERGENCY)
Age: 34
Discharge: HOME | End: 2024-08-13
Payer: COMMERCIAL

## 2024-08-13 VITALS
OXYGEN SATURATION: 100 % | HEART RATE: 61 BPM | SYSTOLIC BLOOD PRESSURE: 121 MMHG | TEMPERATURE: 98.2 F | RESPIRATION RATE: 16 BRPM | DIASTOLIC BLOOD PRESSURE: 74 MMHG | BODY MASS INDEX: 26.73 KG/M2 | WEIGHT: 181 LBS

## 2024-08-13 DIAGNOSIS — B37.0 THRUSH: Primary | ICD-10-CM

## 2024-08-13 PROCEDURE — 99213 OFFICE O/P EST LOW 20 MIN: CPT

## 2024-08-13 PROCEDURE — 99213 OFFICE O/P EST LOW 20 MIN: CPT | Performed by: NURSE PRACTITIONER

## 2024-08-13 ASSESSMENT — PAIN - FUNCTIONAL ASSESSMENT
PAIN_FUNCTIONAL_ASSESSMENT: ACTIVITIES ARE NOT PREVENTED
PAIN_FUNCTIONAL_ASSESSMENT: 0-10

## 2024-08-13 ASSESSMENT — PAIN SCALES - GENERAL: PAINLEVEL_OUTOF10: 1

## 2024-08-13 ASSESSMENT — PAIN DESCRIPTION - FREQUENCY: FREQUENCY: INTERMITTENT

## 2024-08-13 NOTE — ED TRIAGE NOTES
Melecio arrives to room with complaint of  sores on tongue for past 2 weeks  states feels \"tingling and burning\"

## 2024-08-13 NOTE — ED PROVIDER NOTES
[facility] URGENT CARE  UrgentCare Encounter      CHIEFCOMPLAINT       Chief Complaint   Patient presents with    Mouth Lesions       Nurses Notes reviewed and I agree except as noted in the HPI.  HISTORY OF PRESENT ILLNESS   Melecio Zabala is a 33 y.o. male who presents to urgent care with complaints of sores on his tongue.  Symptom onset was 2 to 3 days ago.  Denies any recent antibiotic or steroid use.  Denies any known exposures.  Denies fevers.    REVIEW OF SYSTEMS     Review of Systems   HENT:  Positive for mouth sores.        PAST MEDICAL HISTORY         Diagnosis Date    Anxiety     Bipolar disease, chronic (HCC)     Chronic back pain     Depression        SURGICAL HISTORY     Patient  has a past surgical history that includes Testicle surgery; hernia repair; [place] tooth extraction; Colonoscopy (01/01/2011); Vein Surgery (01/01/2011); Appendectomy (2020); and Knee arthroscopy w/ meniscal repair (Right, 2022).    CURRENT MEDICATIONS       Discharge Medication List as of 8/13/2024 12:31 PM        CONTINUE these medications which have NOT CHANGED    Details   lamoTRIgine (LAMICTAL) 200 MG tablet Take 250 mg by mouth dailyHistorical Med      ARIPiprazole (ABILIFY) 10 MG tablet Take 1 tablet by mouth dailyHistorical Med      propranolol (INDERAL) 10 MG tablet Take 1 tablet by mouth 3 times dailyHistorical Med             ALLERGIES     Patient is is allergic to gabapentin, pcn [penicillins], and penicillins.    FAMILY HISTORY     Patient'sfamily history includes Depression in his mother; Diabetes in his maternal grandmother; Other in his mother; Thyroid Disease in his maternal grandmother.    SOCIAL HISTORY     Patient  reports that he has quit smoking. His smoking use included cigarettes. He has never used smokeless tobacco. He reports that he does not currently use alcohol after a past usage of about 3.0 standard drinks of alcohol per week. He reports current drug use. Drug: Marijuana

## 2024-08-26 PROBLEM — R53.82 CHRONIC FATIGUE: Status: ACTIVE | Noted: 2024-08-26

## 2024-08-26 SDOH — ECONOMIC STABILITY: FOOD INSECURITY: WITHIN THE PAST 12 MONTHS, THE FOOD YOU BOUGHT JUST DIDN'T LAST AND YOU DIDN'T HAVE MONEY TO GET MORE.: SOMETIMES TRUE

## 2024-08-26 SDOH — ECONOMIC STABILITY: TRANSPORTATION INSECURITY
IN THE PAST 12 MONTHS, HAS LACK OF TRANSPORTATION KEPT YOU FROM MEETINGS, WORK, OR FROM GETTING THINGS NEEDED FOR DAILY LIVING?: NO

## 2024-08-26 SDOH — ECONOMIC STABILITY: FOOD INSECURITY: WITHIN THE PAST 12 MONTHS, YOU WORRIED THAT YOUR FOOD WOULD RUN OUT BEFORE YOU GOT MONEY TO BUY MORE.: SOMETIMES TRUE

## 2024-08-26 SDOH — ECONOMIC STABILITY: INCOME INSECURITY: HOW HARD IS IT FOR YOU TO PAY FOR THE VERY BASICS LIKE FOOD, HOUSING, MEDICAL CARE, AND HEATING?: SOMEWHAT HARD

## 2024-08-27 ENCOUNTER — OFFICE VISIT (OUTPATIENT)
Dept: FAMILY MEDICINE CLINIC | Age: 34
End: 2024-08-27
Payer: COMMERCIAL

## 2024-08-27 VITALS
SYSTOLIC BLOOD PRESSURE: 130 MMHG | HEIGHT: 69 IN | OXYGEN SATURATION: 97 % | HEART RATE: 67 BPM | DIASTOLIC BLOOD PRESSURE: 72 MMHG | WEIGHT: 184.2 LBS | TEMPERATURE: 98.2 F | BODY MASS INDEX: 27.28 KG/M2 | RESPIRATION RATE: 14 BRPM

## 2024-08-27 DIAGNOSIS — F33.1 MODERATE EPISODE OF RECURRENT MAJOR DEPRESSIVE DISORDER (HCC): ICD-10-CM

## 2024-08-27 DIAGNOSIS — B37.0 THRUSH: Primary | ICD-10-CM

## 2024-08-27 PROCEDURE — 99213 OFFICE O/P EST LOW 20 MIN: CPT | Performed by: NURSE PRACTITIONER

## 2024-08-27 RX ORDER — ARIPIPRAZOLE 20 MG/1
20 TABLET ORAL DAILY
Qty: 30 TABLET | Refills: 1
Start: 2024-08-27

## 2024-08-27 RX ORDER — CLOTRIMAZOLE 10 MG/1
10 LOZENGE ORAL
Qty: 50 TABLET | Refills: 0 | Status: SHIPPED | OUTPATIENT
Start: 2024-08-27 | End: 2024-09-06

## 2024-08-27 NOTE — PROGRESS NOTES
Melecio Zabala is a 33 y.o. male thatpresents for Follow-up and Thrush (Pt states that he was given nystatin for his thrush and states that it cleared up but only on one side of his tongue. )      History obtained today from Patient.    HPI    Thrush    Started on Nystatin  Feels like 1/2 tongue is better    Seen in Urgent care last week  Noted day before visit  Could see lesions on tongue  No other symptoms.  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  Having PTSD moments, voices in head heard it for a while but comes and goes  No SI but having flashes of seeing himself in car accident or tripping or falling  Not sleeping, waking up twice a night/ broken up sleep  Had enuresis one night otherwise no episodes during the day or daytime symptoms  Drinks a lot of water     This visit 8-    Seen by psychiatry  Abilify increased  Doing much better  Not hallucinating  Working   No concerns  Sleep is ok  Has appt 9/3 with psychiatrist in [place]      EGD and Colonoscopy 2021 Dr Pierre   Referral to Dr Zheng done but pt cancelled appt    I have reviewed the patient's past medical history, past surgical history, allergies,medications, social and family history and I have made updates where appropriate.    Past Medical History:   Diagnosis Date    Anxiety     Bipolar disease, chronic (HCC)     Chronic back pain     Depression        Social History     Tobacco Use    Smoking status: Former     Types: Cigarettes    Smokeless tobacco: Never   Vaping Use    Vaping status: Every Day    Substances: Nicotine, CBD    Devices: Disposable,    PULMONARY: Lungs are clear to auscultation bilaterally.   ABDOMEN: Soft, nontender, nondistended. Positive bowel sounds.       ASSESSMENT & PLAN  1. Thrush  Unable to do fluconazole due to interactions with abilify  Try lozenges    2. Moderate episode of recurrent major depressive disorder (HCC)  Continue Lamictal and Abilify  Fu with psychiatry  Symptoms controlled    No follow-ups on file.        All copied or forwarded information in the progress note was verified by me to be accurate at the time of visit  Patient's past medical, surgical, social and family history were reviewed and updated     All patient questions answered.  Patient voiced understanding.

## 2024-11-02 NOTE — TELEPHONE ENCOUNTER
Mailbox is full, cannot accept messages at this time    [employer] message sent
Needs to f/u with pcp in [place]
Recent Visits  Date Type Provider Dept   04/17/20 Office Visit [facility]   Showing recent visits within past 540 days with a meds authorizing provider and meeting all other requirements     Future Appointments  No visits were found meeting these conditions. Showing future appointments within next 150 days with a meds authorizing provider and meeting all other requirements     No future appointments.
I have met with the patient and/or caregiver to discuss discharge goals and treatment plan. Patient and/or caregiver also provided with instructions on accessing the [employer] Compare websites for additional information related to Post Acute Provider quality and resource use measures to assist them in evaluation of the providers and in selecting their post-acute provider of choice. Patient and caregiver were informed of the facilities that are owned and/or operated by [facility]. I have discussed with the patient the availability of in-network facilities and providers. Patient and caregiver provided with a list of post-acute providers whose services are appropriate to the discharge plans and patient needs.     For patient requiring durable medical equipment, patient and/or caregiver were informed that they have the right to request who provides the required equipment.